# Patient Record
Sex: FEMALE | Race: WHITE | URBAN - METROPOLITAN AREA
[De-identification: names, ages, dates, MRNs, and addresses within clinical notes are randomized per-mention and may not be internally consistent; named-entity substitution may affect disease eponyms.]

---

## 2018-02-27 ENCOUNTER — IMPORTED ENCOUNTER (OUTPATIENT)
Dept: URBAN - METROPOLITAN AREA CLINIC 50 | Facility: CLINIC | Age: 48
End: 2018-02-27

## 2018-02-27 NOTE — PATIENT DISCUSSION
"Observe.  ""Continue Artificial tears both eyes two - four times a day. "" ""Stop FML Fluorometholone right eye . """

## 2019-11-26 ENCOUNTER — IMPORTED ENCOUNTER (OUTPATIENT)
Dept: URBAN - METROPOLITAN AREA CLINIC 50 | Facility: CLINIC | Age: 49
End: 2019-11-26

## 2019-11-26 NOTE — PATIENT DISCUSSION
"""Continue Artificial tears both eyes two - four times a day ."" ""Continue Bepreve both eyes twice a day ."" ""Start lotemax SM both eyes twice a day . """

## 2019-12-24 ENCOUNTER — IMPORTED ENCOUNTER (OUTPATIENT)
Dept: URBAN - METROPOLITAN AREA CLINIC 50 | Facility: CLINIC | Age: 49
End: 2019-12-24

## 2021-04-17 ASSESSMENT — VISUAL ACUITY
OS_CC: 20/30+2
OD_CC: 20/20
OD_CC: 20/20-1
OS_CC: J1+
OD_CC: J1+
OS_CC: 20/25+-

## 2021-04-17 ASSESSMENT — TONOMETRY
OD_IOP_MMHG: 15
OS_IOP_MMHG: 16